# Patient Record
Sex: FEMALE | Race: ASIAN | NOT HISPANIC OR LATINO | Employment: FULL TIME | URBAN - METROPOLITAN AREA
[De-identification: names, ages, dates, MRNs, and addresses within clinical notes are randomized per-mention and may not be internally consistent; named-entity substitution may affect disease eponyms.]

---

## 2020-03-07 ENCOUNTER — HOSPITAL ENCOUNTER (EMERGENCY)
Facility: HOSPITAL | Age: 25
Discharge: HOME | End: 2020-03-07
Attending: EMERGENCY MEDICINE | Admitting: EMERGENCY MEDICINE

## 2020-03-07 VITALS
SYSTOLIC BLOOD PRESSURE: 125 MMHG | OXYGEN SATURATION: 92 % | TEMPERATURE: 97.3 F | HEART RATE: 79 BPM | RESPIRATION RATE: 15 BRPM | HEIGHT: 63 IN | DIASTOLIC BLOOD PRESSURE: 82 MMHG

## 2020-03-07 DIAGNOSIS — J02.0 STREP THROAT: Primary | ICD-10-CM

## 2020-03-07 LAB
FLUAV AG NPH QL: NEGATIVE
FLUBV AG NPH QL IA: NEGATIVE
S PYO AG THROAT QL: POSITIVE

## 2020-03-07 PROCEDURE — 99283 EMERGENCY DEPT VISIT LOW MDM: CPT

## 2020-03-07 PROCEDURE — 87804 INFLUENZA ASSAY W/OPTIC: CPT | Performed by: PHYSICIAN ASSISTANT

## 2020-03-07 PROCEDURE — 96372 THER/PROPH/DIAG INJ SC/IM: CPT

## 2020-03-07 PROCEDURE — 87880 STREP A ASSAY W/OPTIC: CPT | Performed by: PHYSICIAN ASSISTANT

## 2020-03-07 PROCEDURE — 25010000002 PENICILLIN G BENZATHINE PER 600000 UNITS: Performed by: PHYSICIAN ASSISTANT

## 2020-03-07 RX ADMIN — PENICILLIN G BENZATHINE 1.2 MILLION UNITS: 600000 INJECTION, SUSPENSION INTRAMUSCULAR at 21:59

## 2020-03-08 NOTE — ED PROVIDER NOTES
EMERGENCY DEPARTMENT ENCOUNTER    CHIEF COMPLAINT  Chief Complaint: cough and sneezing   History given by: patient   History limited by: nothing  Room Number: 20/20  PMD: Provider, No Known      HPI:  Pt is a 24 y.o. female who presents complaining of constant dry cough and sneezing that started last night. Pt also c/o rhinorrhea and sore throat. Pt denies any ear pain. Pt has been around a friend who recently had bronchitis. Pt denies any recent travelling. No hx of asthma. There are no other complaints at this time.       PAST MEDICAL HISTORY  Active Ambulatory Problems     Diagnosis Date Noted   • No Active Ambulatory Problems     Resolved Ambulatory Problems     Diagnosis Date Noted   • No Resolved Ambulatory Problems     No Additional Past Medical History       PAST SURGICAL HISTORY  History reviewed. No pertinent surgical history.    FAMILY HISTORY  History reviewed. No pertinent family history.    SOCIAL HISTORY  Social History     Socioeconomic History   • Marital status: Single     Spouse name: Not on file   • Number of children: Not on file   • Years of education: Not on file   • Highest education level: Not on file   Tobacco Use   • Smoking status: Never Smoker   Substance and Sexual Activity   • Alcohol use: Yes     Frequency: Never     Comment: occasion   • Drug use: Never       ALLERGIES  Patient has no known allergies.    REVIEW OF SYSTEMS  Review of Systems   Constitutional: Negative for fever.   HENT: Positive for rhinorrhea, sneezing and sore throat. Negative for ear pain.    Respiratory: Positive for cough (dry). Negative for shortness of breath.    Cardiovascular: Negative for chest pain.   Gastrointestinal: Negative for abdominal pain, diarrhea and vomiting.   Genitourinary: Negative for dysuria.   Musculoskeletal: Negative for neck pain.   Skin: Negative for rash.   Neurological: Negative for weakness, numbness and headaches.   All other systems reviewed and are negative.      PHYSICAL  EXAM  ED Triage Vitals   Temp Heart Rate Resp BP SpO2   03/07/20 2006 03/07/20 2006 03/07/20 2033 03/07/20 2029 03/07/20 2006   97.3 °F (36.3 °C) 79 16 109/68 100 %      Temp src Heart Rate Source Patient Position BP Location FiO2 (%)   -- 03/07/20 2006 -- -- --    Monitor          Physical Exam   Constitutional: She is oriented to person, place, and time. No distress.   HENT:   Head: Normocephalic and atraumatic.   Right Ear: Tympanic membrane is not erythematous.   Left Ear: Tympanic membrane is not erythematous.   Mouth/Throat: Posterior oropharyngeal erythema present. No oropharyngeal exudate or posterior oropharyngeal edema.   Eyes: Pupils are equal, round, and reactive to light. EOM are normal.   Neck: Normal range of motion. Neck supple.   No meningeal signs.   Cardiovascular: Normal rate, regular rhythm and normal heart sounds.   Pulmonary/Chest: Effort normal and breath sounds normal. No respiratory distress.   Musculoskeletal: Normal range of motion. She exhibits no edema.   Lymphadenopathy:     She has no cervical adenopathy.   Neurological: She is alert and oriented to person, place, and time. She has normal sensation and normal strength.   Skin: Skin is warm and dry. No rash noted.   Psychiatric: Mood and affect normal.   Nursing note and vitals reviewed.      LAB RESULTS  Lab Results (last 24 hours)     Procedure Component Value Units Date/Time    Influenza Antigen, Rapid - Swab, Nasopharynx [[id number]]  (Normal) Collected:  03/07/20 2031    Specimen:  Swab from Nasopharynx Updated:  03/07/20 2110     Influenza A Ag, EIA Negative     Influenza B Ag, EIA Negative    Rapid Strep A Screen - Swab, Throat [[id number]]  (Abnormal) Collected:  03/07/20 2031    Specimen:  Swab from Throat Updated:  03/07/20 2110     Strep A Ag Positive          I ordered the above labs and reviewed the results.        PROCEDURES  Procedures      PROGRESS AND CONSULTS       2016  Ordered strep swab and influenza swab for further  evaluation.     2145  Rechecked pt. Pt is resting comfortably. Notified pt of lab results which revealed positive results for Strep A. Discussed the plan to discharge after receiving antibiotic injection. Pt understands and agrees with the plan, all questions answered.    2148  Ordered Bicillin-LA injection.     2150  Discussed pt case with Dr. Velasquez (ER physician). After his own bedside evaluation of the pt, Dr. Velasquez agrees with the plan of care.       MEDICAL DECISION MAKING  Results were reviewed/discussed with the patient and they were also made aware of online access. Pt also made aware that some labs, such as cultures, will not be resulted during ER visit and follow up with PMD is necessary.     MDM  Number of Diagnoses or Management Options  Strep throat:      Amount and/or Complexity of Data Reviewed  Clinical lab tests: reviewed and ordered (Strep A= positive)  Review and summarize past medical records: yes (Pt has no previous medical records available in Epic.)  Discuss the patient with other providers: yes (Dr. Velasquez (ER physician))           DIAGNOSIS  Final diagnoses:   Strep throat       DISPOSITION  DISCHARGE    Patient discharged in stable condition.    Reviewed implications of results, diagnosis, meds, responsibility to follow up, warning signs and symptoms of possible worsening, potential complications and reasons to return to ER.    Patient/Family voiced understanding of above instructions.    Discussed plan for discharge, as there is no emergent indication for admission. Patient referred to primary care provider for BP management due to today's BP. Pt/family is agreeable and understands need for follow up and repeat testing.  Pt is aware that discharge does not mean that nothing is wrong but it indicates no emergency is present that requires admission and they must continue care with follow-up as given below or physician of their choice.     FOLLOW-UP  PATIENT LIAISON [facility]  [place] [id number]  [phone]  Schedule an appointment as soon as possible for a visit in 1 week           Medication List      No changes were made to your prescriptions during this visit.         Latest Documented Vital Signs:  As of 11:09 PM  BP- 125/82 HR- 79 Temp- 97.3 °F (36.3 °C) O2 sat- 92%    --  Documentation assistance provided by scott Morris for Jose Lopez PA-C. Information recorded by the scribe was done at my direction and has been verified and validated by me.       Elena Morris  03/07/20 [id number]       Jose Lopez PA  03/07/20 [id number]

## 2020-03-08 NOTE — ED TRIAGE NOTES
Pt arrived at triage desk from parking lot with c/o sore throat that hurts when swallowing and cough.  Symptoms began last night.

## 2020-03-08 NOTE — DISCHARGE INSTRUCTIONS
Home, rest, tylenol or ibuprofen as needed, keep well hydrated, follow up with PCP for recheck. Return to care with further concerns.